# Patient Record
Sex: MALE | Race: WHITE | NOT HISPANIC OR LATINO | Employment: FULL TIME | URBAN - METROPOLITAN AREA
[De-identification: names, ages, dates, MRNs, and addresses within clinical notes are randomized per-mention and may not be internally consistent; named-entity substitution may affect disease eponyms.]

---

## 2023-10-11 ENCOUNTER — APPOINTMENT (OUTPATIENT)
Dept: RADIOLOGY | Facility: HOSPITAL | Age: 63
End: 2023-10-11
Payer: COMMERCIAL

## 2023-10-11 ENCOUNTER — PHARMACY VISIT (OUTPATIENT)
Dept: PHARMACY | Facility: CLINIC | Age: 63
End: 2023-10-11
Payer: MEDICARE

## 2023-10-11 ENCOUNTER — HOSPITAL ENCOUNTER (EMERGENCY)
Facility: HOSPITAL | Age: 63
Discharge: HOME | End: 2023-10-11
Payer: COMMERCIAL

## 2023-10-11 PROCEDURE — 73564 X-RAY EXAM KNEE 4 OR MORE: CPT | Mod: RT,FY

## 2023-10-11 PROCEDURE — RXMED WILLOW AMBULATORY MEDICATION CHARGE

## 2023-10-11 PROCEDURE — 73564 X-RAY EXAM KNEE 4 OR MORE: CPT | Mod: RIGHT SIDE | Performed by: RADIOLOGY

## 2023-10-11 PROCEDURE — 2500000001 HC RX 250 WO HCPCS SELF ADMINISTERED DRUGS (ALT 637 FOR MEDICARE OP): Performed by: NURSE PRACTITIONER

## 2023-10-11 PROCEDURE — 99283 EMERGENCY DEPT VISIT LOW MDM: CPT | Mod: 25

## 2023-10-11 RX ORDER — OXYCODONE AND ACETAMINOPHEN 5; 325 MG/1; MG/1
1 TABLET ORAL ONCE
Status: COMPLETED | OUTPATIENT
Start: 2023-10-11 | End: 2023-10-11

## 2023-10-11 RX ORDER — OXYCODONE HYDROCHLORIDE 5 MG/1
5 TABLET ORAL EVERY 6 HOURS PRN
Qty: 12 TABLET | Refills: 0 | Status: SHIPPED | OUTPATIENT
Start: 2023-10-11

## 2023-10-11 RX ADMIN — OXYCODONE HYDROCHLORIDE AND ACETAMINOPHEN 1 TABLET: 5; 325 TABLET ORAL at 10:30

## 2023-10-11 ASSESSMENT — COLUMBIA-SUICIDE SEVERITY RATING SCALE - C-SSRS
6. HAVE YOU EVER DONE ANYTHING, STARTED TO DO ANYTHING, OR PREPARED TO DO ANYTHING TO END YOUR LIFE?: NO
1. IN THE PAST MONTH, HAVE YOU WISHED YOU WERE DEAD OR WISHED YOU COULD GO TO SLEEP AND NOT WAKE UP?: NO
2. HAVE YOU ACTUALLY HAD ANY THOUGHTS OF KILLING YOURSELF?: NO

## 2023-10-11 ASSESSMENT — PAIN SCALES - GENERAL: PAINLEVEL_OUTOF10: 7

## 2023-10-11 NOTE — Clinical Note
Juan Pablo Puckett was seen and treated in our emergency department on 10/11/2023.  He may return to work on 10/18/2023.  Off work until cleared by orthopedics     If you have any questions or concerns, please don't hesitate to call.      Shayna Mallory, APRN-CNP

## 2023-10-11 NOTE — ED PROVIDER NOTES
HPI   Chief Complaint   Patient presents with    Knee Pain     Mechanical fall, tripped and fell onto right knee        63-year-old male with a past history of type 1 diabetes, hypertension, hyperlipidemia presents the emergency department today for a mechanical fall.  Patient states that he was walking up the steps at work when he fell landing on the cement corner of the step injuring his right knee.  He immediately began having pain and saw an indentation in his knee.  There is significant swelling.  No numbness or tingling in the extremity.  Did not take anything prior to arrival for his pain.  He has had a right hip ORIF in the past due to fracture back in 2011.  No other pain or injury.  Did not hit his head or lose consciousness.                          No data recorded                Patient History   No past medical history on file.  No past surgical history on file.  No family history on file.  Social History     Tobacco Use    Smoking status: Not on file    Smokeless tobacco: Not on file   Substance Use Topics    Alcohol use: Not on file    Drug use: Not on file       Physical Exam   ED Triage Vitals   Temp Pulse Resp BP   -- -- -- --      SpO2 Temp src Heart Rate Source Patient Position   -- -- -- --      BP Location FiO2 (%)     -- --       Physical Exam  Vitals reviewed.   Constitutional:       Appearance: Normal appearance.   HENT:      Head: Normocephalic.   Cardiovascular:      Rate and Rhythm: Normal rate and regular rhythm.   Pulmonary:      Effort: Pulmonary effort is normal.      Breath sounds: Normal breath sounds.   Abdominal:      General: Abdomen is flat.      Palpations: Abdomen is soft.   Musculoskeletal:      Right knee: Swelling, deformity, ecchymosis and bony tenderness present. Decreased range of motion.      Left knee: Normal.   Skin:     General: Skin is warm and dry.      Capillary Refill: Capillary refill takes less than 2 seconds.   Neurological:      Mental Status: He is alert.          Labs Reviewed - No data to display  Pain Management Panel           No data to display              XR knee right 4+ views   Final Result   Comminuted right patellar fracture with significant separation of the   major fragments.             Signed by: Bronson Rich 10/11/2023 11:00 AM   Dictation workstation:   [id number]          ED Course & MDM   Diagnoses as of 10/11/23 1230   Closed displaced fracture of left patella, unspecified fracture morphology, initial encounter       Medical Decision Making  On initial evaluation patient is well-appearing in the emergency department at this time however his rate discomfort is 7 out of 10.  He does have significant swelling of the right knee.  Ice was immediately placed on the extremity.  He was given a dose of oxycodone p.o. in the ER.  X-ray was obtained.  X-ray does show a comminuted right patellar fracture with significant separation of the major fragments.  Discussed results with the patient and his pain has improved.  I did send a message to orthopedic on-call Dr. Valverde who recommends the patient be seen in his office on Tuesday.  Patient is a patient of the [facility] and states that he will follow-up with Dr. Davis.  I did attempt to call Dr. Napoles's office and get an appointment however as this is Workmen's Comp. they were unable to schedule yet however patient is aware of how to do this.  We did perform meds to beds for the patient.  He is placed in a knee immobilizer by nursing staff with crutches.  I discussed return precautions with him resting icing and elevation.  He verbalized understanding agreement this plan has no further questions or concerns.  We did push his images over to the [facility].  OARRS was reviewed.  Meds to beds were performed and he had a safe drive to drive her home.            Procedure  Procedures      Differential Diagnoses include   This is not an exhaustive list of all the diagnosis and therapeutics are considered  during the patient's evaluation for an emergency medical condition.    I discussed the differential, results and discharge plan with the patient and/or family/friend/caregiver if present.  I emphasized the importance of follow-up with the physician I referred them to in the timeframe recommended.  I explained reasons for the patient to return to the Emergency Department. Additional verbal discharge instructions were also given and discussed with the patient to supplement those generated by the EMR. We also discussed medications that were prescribed (if any) including common side effects and interactions. The patient was advised to abstain from driving, operating heavy machinery or making significant decisions while taking medications such as opiates and muscle relaxers that may impair this. All questions were addressed.  They understand return precautions and discharge instructions. The patient and/or family/friend/caregiver expressed understanding.           JACQUELYN Orozco-LUIS  10/11/23 1230       JACQUELYN Orozco-CNP  11/11/23 [id number]